# Patient Record
Sex: FEMALE | Race: BLACK OR AFRICAN AMERICAN
[De-identification: names, ages, dates, MRNs, and addresses within clinical notes are randomized per-mention and may not be internally consistent; named-entity substitution may affect disease eponyms.]

---

## 2018-01-02 ENCOUNTER — HOSPITAL ENCOUNTER (EMERGENCY)
Dept: HOSPITAL 74 - JER | Age: 38
Discharge: HOME | End: 2018-01-02
Payer: COMMERCIAL

## 2018-01-02 VITALS — HEART RATE: 82 BPM | SYSTOLIC BLOOD PRESSURE: 126 MMHG | DIASTOLIC BLOOD PRESSURE: 75 MMHG | TEMPERATURE: 98.6 F

## 2018-01-02 VITALS — BODY MASS INDEX: 30 KG/M2

## 2018-01-02 DIAGNOSIS — F17.210: ICD-10-CM

## 2018-01-02 DIAGNOSIS — M54.12: ICD-10-CM
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
## 2018-01-02 DIAGNOSIS — S16.1XXA: Primary | ICD-10-CM
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
## 2018-01-02 PROCEDURE — 3E0233Z INTRODUCTION OF ANTI-INFLAMMATORY INTO MUSCLE, PERCUTANEOUS APPROACH: ICD-10-PCS

## 2018-01-02 NOTE — PDOC
History of Present Illness





- General


Chief Complaint: Headache


Stated Complaint: CHEST PAIN


Time Seen by Provider: 01/02/18 12:01


History Source: Patient


Exam Limitations: No Limitations





- History of Present Illness


Initial Comments: 








CHIEF COMPLAINT:  38 y/o afebrile female with PMH chronic pain (on tramadol and 

oxy at home daily) c/o left sided head pain that started today. 





HISTORY OF PRESENT ILLNESS:  The patient states the pain actually starts in the 

left side of her neck and shoots up to her head and down to her left arm and 

chest.  She states the pain is worse with certain neck movements and when she 

touches the area.  She denies f/c, n/v/d, cough, SOB, changes in vision/hearing

, abd pain, back pain, hematuria, dysuria. 





Vital signs on arrival are within normal limits. 





REVIEW OF SYSTEMS:


GENERAL/CONSTITUTIONAL: No fever/chills. No weakness. No weight change.


HEAD, EYES, EARS, NOSE AND THROAT: No change in vision. No ear pain or 

discharge. No sore throat.


CARDIOVASCULAR: +chest pain.  No shortness of breath.


RESPIRATORY: No cough, wheezing, or hemoptysis.


GASTROINTESTINAL: No abd pain, nausea, vomiting, diarrhea. 


GENITOURINARY: No dysuria, frequency, or change in urination.


MUSCULOSKELETAL: +left sided neck pain.  +left arm pain


SKIN: No rash or easy bruising.


NEUROLOGIC: +left head pain.  No headache, vertigo, loss of consciousness, or 

loss of sensation.








PHYSICAL EXAM:


GENERAL: The patient is awake, alert, and fully oriented, in no acute distress.


HEAD: reproducible pain with palpation of left posterior SCM muscle and 

posterior scalp.  Normal with no signs of trauma.  No hematomas. 


NECK: Reproducible pain with palpation of left SCM muscle and left cervical 

paravertebral muscles with radiation to left arm and anterior chest.  Pain with 

lateral movement of neck towards right shoulder. 


ENT: Pupils equal, round and reactive to light, extraocular movements intact, 

sclera anicteric, conjunctiva clear. 


LUNGS: Clear to auscultation bilaterally. Normal excursion. No respiratory 

distress or use of accessory muscles.


CV: RRR, S1/S2, no MRG. Cap refill < 2 sec.


CHEST:  Reproducible anterior chest pain with palpation. 


ABDOMEN: Soft, non-distended, non-tender even to deep palpation, no 

hepatomegaly or splenomegaly, no masses.


EXTREMITIES: Normal range of motion, no edema.


NEUROLOGICAL: Normal speech, normal gait. CN II-XII grossly intact.


PSYCH: Normal mood, normal affect.


SKIN: Warm, dry, normal turgor, no rashes or lesions noted.











Past History





- Past Medical History


Allergies/Adverse Reactions: 


 Allergies











Allergy/AdvReac Type Severity Reaction Status Date / Time


 


No Known Allergies Allergy   Verified 01/02/18 10:24











Home Medications: 


Ambulatory Orders





Oxycodone HCl/Acetaminophen [Oxycodon-Acetaminophen 7.5-325] 1 each PO QID PRN 

01/02/18 


Tramadol HCl 50 mg PO DAILY PRN MDD back pain 01/02/18 








COPD: No


Other medical history: back injury/back pain





- Suicide/Smoking/Psychosocial Hx


Smoking History: Current every day smoker


Have you smoked in the past 12 months: Yes


Number of Cigarettes Smoked Daily: 6


Information on smoking cessation initiated: Yes


'Breaking Loose' booklet given: 01/02/18


Hx Alcohol Use: No


Drug/Substance Use Hx: No


Substance Use Type: None





*Physical Exam





- Vital Signs


 Last Vital Signs











Temp Pulse Resp BP Pulse Ox


 


 98.6 F   82   18   126/75   100 


 


 01/02/18 10:25  01/02/18 10:25  01/02/18 10:25  01/02/18 10:25  01/02/18 10:25














**Heart Score/ECG Review





- ECG Intrepretation


Comment:: 








Twelve-lead EKG was performed and reviewed by Dr. Payton. There is normal 

sinus rhythm with a normal rate. The axis is normal. The intervals are normal. 

There are no ST or T wave abnormalities.





Impression: Normal twelve-lead EKG





Medical Decision Making





- Medical Decision Making





A/P:  38 y/o afebrile female with left neck strain and radiculopathy.  Plan is 

as follows:





1. EKG


2. hcg


3. IM Toradol





EKG normal 





hcg - negative


Toradol given





Patient admits she takes Tramadol 5x/day and oxycodone.  She states the 

tramadol does help with the neck and arm pain.  I suggested she continue taking 

the tramadol for the musculoskeletal pain, stretch her neck muscles and 

alternate with heat/ice to affected area.  Instructed her to f/u with a PCP and 

referred her to Dr. Lopez.  Instructed her to return to the ER with any 

worsening or concerning symptoms. 





The patient verbalizes understanding of all instructions, has no further 

questions and is awaiting discharge.





*DC/Admit/Observation/Transfer


Diagnosis at time of Disposition: 


 Radiculopathy of arm, Musculoskeletal chest pain





Neck muscle strain


Qualifiers:


 Encounter type: initial encounter Qualified Code(s): S16.1XXA - Strain of 

muscle, fascia and tendon at neck level, initial encounter








- Discharge Dispostion


Disposition: HOME


Condition at time of disposition: Improved





- Referrals


Referrals: 


Joe Lopez MD [Staff Physician] - 





- Patient Instructions


Printed Discharge Instructions:  DI for Cervical Radiculopathy, DI for 

Musculoskeletal Pain, How To Perform RICE (Rest, Ice, Compress, Elevate)


Additional Instructions: 


Discharge Instructions:


-Continue taking your pain medication at home as prescribed


-Alternate between a heating pad and an ice pack to the affected area of your 

neck. 


-stretch your neck muscles multiple times per day


-Follow up with Dr. Lopez as soon as possible


-Return to the ER with any worsening or concerning symptoms. 





- Post Discharge Activity

## 2018-01-04 NOTE — EKG
Test Reason : 

Blood Pressure : ***/*** mmHG

Vent. Rate : 084 BPM     Atrial Rate : 084 BPM

   P-R Int : 148 ms          QRS Dur : 086 ms

    QT Int : 394 ms       P-R-T Axes : 053 048 041 degrees

   QTc Int : 465 ms

 

NORMAL SINUS RHYTHM

NORMAL ECG

NO PREVIOUS ECGS AVAILABLE

Confirmed by MEGAN POZO MD ([id number]) on 1/4/2018 12:30:31 PM

 

Referred By:             Confirmed By:MEGAN POZO MD

## 2018-05-04 ENCOUNTER — HOSPITAL ENCOUNTER (EMERGENCY)
Dept: HOSPITAL 74 - JERFT | Age: 38
Discharge: LEFT BEFORE DISCHARGE | End: 2018-05-04
Payer: COMMERCIAL

## 2018-05-04 VITALS — TEMPERATURE: 98.7 F | HEART RATE: 89 BPM | SYSTOLIC BLOOD PRESSURE: 136 MMHG | DIASTOLIC BLOOD PRESSURE: 66 MMHG

## 2018-05-04 VITALS — BODY MASS INDEX: 25.7 KG/M2

## 2018-05-04 DIAGNOSIS — R20.0: Primary | ICD-10-CM

## 2018-05-04 DIAGNOSIS — F17.210: ICD-10-CM

## 2018-05-04 DIAGNOSIS — H53.2: ICD-10-CM

## 2018-05-04 NOTE — PDOC
History of Present Illness





- General


Chief Complaint: Head/Neck problem


Stated Complaint: PAIN/ HEAD, FACE


Time Seen by Provider: 05/04/18 11:57


History Source: Patient


Exam Limitations: No Limitations





- History of Present Illness


Initial Comments: 





05/04/18 14:36


Patient is a 38-year-old female no past medical history presents emergency 

department today complaining of numbness to the left side of her face. Patient 

states that she's had this problem for approximately one year intermittently. 

She was initially evaluated by year ago at [facility] and had a negative CT 

at that time. She followed up with both her primary care doctor and a 

neurologist were unable to find the source of her tingling. She states that 

over the past week the tingling is back in sensation in stronger than last 

time. Patient also reports seeing double. Denies speech changes, facial droop, 

one-sided weakness, fevers, chills, nausea, vomiting and diarrhea.





Past History





- Travel


Traveled outside of the country in the last 30 days: No


Close contact w/someone who was outside of country & ill: No





- Past Medical History


Allergies/Adverse Reactions: 


 Allergies











Allergy/AdvReac Type Severity Reaction Status Date / Time


 


No Known Allergies Allergy   Verified 05/04/18 11:23











Home Medications: 


Ambulatory Orders





NK [No Known Home Medication]  05/04/18 








COPD: No





- Suicide/Smoking/Psychosocial Hx


Smoking History: Current every day smoker


Have you smoked in the past 12 months: Yes


Number of Cigarettes Smoked Daily: 6


Information on smoking cessation initiated: No


'Breaking Loose' booklet given: 01/02/18


Hx Alcohol Use: No


Drug/Substance Use Hx: No


Substance Use Type: None





**Review of Systems





- Review of Systems


Able to Perform ROS?: Yes


Comments:: 





05/04/18 14:33


CONSTITUTIONAL: 


Absent: fever, chills, diaphoresis, generalized weakness, malaise, loss of 

appetite


HEENT: 


Present: diploplia Absent: rhinorrhea, nasal congestion, throat pain, throat 

swelling, difficulty swallowing, mouth swelling, ear pain, eye pain, visual 

Changes


CARDIOVASCULAR: 


Absent: chest pain, loss of consciousness, palpitations, irregular heart rate, 

peripheral edema


RESPIRATORY: 


Absent: cough, shortness of breath, dyspnea with exertion, orthopnea, wheezing, 

stridor, hemoptysis


GASTROINTESTINAL:


Absent: abdominal pain, abdominal distension, nausea, vomiting, diarrhea, 

constipation, melena, hematochezia


GENITOURINARY: 


Absent: dysuria, frequency, urgency, hesitancy, hematuria, flank pain, genital 

pain


MUSCULOSKELETAL: 


Absent: myalgia, arthralgia, joint swelling


SKIN: 


Absent: rash, itching, pallor


NEUROLOGIC: 


Present: L facial numbness Absent: headache, focal weakness or paresthesias, 

dizziness, unsteady gait, seizure, mental status changes, bladder or bowel 

incontinence


PSYCHIATRIC: 


Absent: anxiety, depression, suicidal or homicidal ideation, hallucinations.


05/04/18 19:36





Is the patient limited English proficient: No





*Physical Exam





- Vital Signs


 Last Vital Signs











Temp Pulse Resp BP Pulse Ox


 


 98.7 F   89   18   136/66   100 


 


 05/04/18 11:23  05/04/18 11:23  05/04/18 11:23  05/04/18 11:23  05/04/18 11:23














- Physical Exam


Comments: 





05/04/18 19:34


GENERAL:


Well developed, well nourished. Awake and alert. No acute distress.


HEENT:


Normocephalic, atraumatic. PERRLA, EOMI with diploplia. No conjunctival pallor. 

Sclera are non-icteric. Moist mucous membranes. Oropharynx is clear.


NECK: 


Supple. Full ROM. No JVD. Carotid pulses 2+ and symmetric, without bruits. No 

thyromegaly. No lymphadenopathy.


MUSCULOSKELETAL 


Normal range of motion at all joints. No bony deformities or tenderness. No CVA 

tenderness.


EXTREMITIES: 


No cyanosis. No clubbing. No edema. No calf tenderness.


SKIN: 


Warm and dry. Normal capillary refill. No rashes. No jaundice. 


NEUROLOGICAL: 


Alert, awake, appropriate. Cranial nerves 2-12 intact. No deficits to light 

touch and temperature in face, upper extremities and lower extremities. No 

motor deficits in the in face, upper extremities and lower extremities. 

Normoreflexic in the upper and lower extremities. Normal speech. Toes are down-

going bilaterally. Gait is normal without ataxia.


PSYCHIATRIC: 


Cooperative. Good eye contact. Appropriate mood and affect.


0








ED Treatment Course





- RADIOLOGY


Radiology Studies Ordered: 














 Category Date Time Status


 


 HEAD CT WITHOUT CONTRAST [CT] Stat CT Scan  05/04/18 12:28 Ordered














Medical Decision Making





- Medical Decision Making





05/04/18 14:31


Pt. is a 37 y/o F who presented to the ED c/o facial numbness intermittantly 

for one year with exacerbation over the past week. Neuro exam is grossly 

normal. Pt reports that sensation feels the same on both sides on exam. VSS, pt 

afebrile.  CT head was ordered for the patient given numbness and diploplia. 

Prior to pt receiving the scan she gets into a verbal altercation with a person 

on the phone. She is screaming into the phone and steps out of the exam room 

saying "Discharge me, I'm out." and proceeds to walk out the Emergency 

Department. Pt elopes from the ED.








*DC/Admit/Observation/Transfer


Diagnosis at time of Disposition: 


 Eloped








- Discharge Dispostion


Disposition: ELOPED





- Referrals





- Patient Instructions





- Post Discharge Activity

## 2018-12-04 ENCOUNTER — HOSPITAL ENCOUNTER (EMERGENCY)
Dept: HOSPITAL 74 - JER | Age: 38
LOS: 1 days | Discharge: HOME | End: 2018-12-05
Payer: COMMERCIAL

## 2018-12-04 VITALS — SYSTOLIC BLOOD PRESSURE: 133 MMHG | TEMPERATURE: 98.9 F | HEART RATE: 78 BPM | DIASTOLIC BLOOD PRESSURE: 82 MMHG

## 2018-12-04 VITALS — BODY MASS INDEX: 26.7 KG/M2

## 2018-12-04 DIAGNOSIS — R51: Primary | ICD-10-CM

## 2018-12-04 DIAGNOSIS — F17.210: ICD-10-CM

## 2018-12-04 PROCEDURE — 3E0233Z INTRODUCTION OF ANTI-INFLAMMATORY INTO MUSCLE, PERCUTANEOUS APPROACH: ICD-10-PCS

## 2018-12-05 NOTE — PDOC
*Physical Exam





- Vital Signs


 Last Vital Signs











Temp Pulse Resp BP Pulse Ox


 


 98.9 F   78   17   133/82   100 


 


 12/04/18 22:54  12/04/18 22:54  12/04/18 22:54  12/04/18 22:54  12/04/18 22:54














ED Treatment Course





- ADDITIONAL ORDERS


Additional order review: 


 Laboratory  Results











  12/05/18





  00:07


 


Urine HCG, Qual  Negative














Medical Decision Making





- Medical Decision Making





12/05/18 00:34


Pt seen by Midlevel Provider under my direct supervision


Pt interviewed and examined


Ancillary studies reviewed





I agree with plan as outlined by Midlevel Provider





*DC/Admit/Observation/Transfer





- Referrals


Referrals: 


Genny Tovar NP [Primary Care Provider] - 





- Patient Instructions





- Post Discharge Activity

## 2018-12-05 NOTE — PDOC
History of Present Illness





- General


Chief Complaint: Pain


Stated Complaint: PAIN


Time Seen by Provider: 12/04/18 22:55


History Source: Patient





Past History





- Past Medical History


Allergies/Adverse Reactions: 


 Allergies











Allergy/AdvReac Type Severity Reaction Status Date / Time


 


latex Allergy Mild Itching Verified 12/04/18 22:57











Home Medications: 


Ambulatory Orders





NK [No Known Home Medication]  05/04/18 








COPD: No





- Suicide/Smoking/Psychosocial Hx


Smoking History: Current every day smoker


Have you smoked in the past 12 months: Yes


Number of Cigarettes Smoked Daily: 6


Information on smoking cessation initiated: No


'Breaking Loose' booklet given: 01/02/18


Hx Alcohol Use: No


Drug/Substance Use Hx: No


Substance Use Type: None





*Physical Exam





- Vital Signs


 Last Vital Signs











Temp Pulse Resp BP Pulse Ox


 


 98.9 F   78   17   133/82   100 


 


 12/04/18 22:54  12/04/18 22:54  12/04/18 22:54  12/04/18 22:54  12/04/18 22:54














- Physical Exam


General Appearance: No: Apparent Distress


HEENT: positive: EOMI, JULIA, Normal Voice


Neck: positive: Supple.  negative: Tender, Rigid, Decreased range of motion, 

Rigidity, Tender lateral, Tender midline


Respiratory/Chest: positive: Lungs Clear, Normal Breath Sounds.  negative: 

Respiratory Distress


Cardiovascular: positive: Regular Rhythm, Regular Rate, S1, S2.  negative: 

Murmur


Gastrointestinal/Abdominal: positive: Normal Bowel Sounds, Soft.  negative: 

Tender, Distended, Guarding, Rebound


Neurologic: positive: CNs II-XII NML intact, Fully Oriented, Alert, Normal Mood/

Affect, Normal Response, Motor Strength 5/5.  negative: Facial Droop, Sensory 

Deficit, Confused, Disoriented





Moderate Sedation





- Procedure Monitoring


Vital Signs: 


Procedure Monitoring Vital Signs











Temperature  98.9 F   12/04/18 22:54


 


Pulse Rate  78   12/04/18 22:54


 


Respiratory Rate  17   12/04/18 22:54


 


Blood Pressure  133/82   12/04/18 22:54


 


O2 Sat by Pulse Oximetry (%)  100   12/04/18 22:54











ED Treatment Course





- ADDITIONAL ORDERS


Additional order review: 


 Laboratory  Results











  12/05/18





  00:07


 


Urine HCG, Qual  Negative














- Medications


Given in the ED: 


ED Medications














Discontinued Medications














Generic Name Dose Route Start Last Admin





  Trade Name Freq  PRN Reason Stop Dose Admin


 


Ketorolac Tromethamine  30 mg  12/05/18 00:48  12/05/18 00:55





  Toradol Injection -  IM  12/05/18 00:49  30 mg





  ONCE ONE   Administration





     





     





     





     


 


Methocarbamol  1,000 mg  12/05/18 00:48  12/05/18 00:55





  Robaxin -  PO  12/05/18 00:49  1,000 mg





  ONCE ONE   Administration





     





     





     





     














Medical Decision Making





- Medical Decision Making


39 y/o F hx of chronic HA presents with L sided HA and L sided neck stiffness 

going on for around 1 year, worsening today. Patient was seen in ED on 1/2018 

and 5/2018 for similar complaints. States she has also been to [facility] and 

has had CT imaging and MRI done of her brain (a few months back; unsure of 

exact date), but nothing was found. States she was referred to a neurologist, 

but also nothing was done. Mentions Percocet and Valium help with the pain. 

States she also saw her PCP who told her it was probably related to sinuses but 

that was also few months back. Denies fever, sob, cp, abd pain, n/v, photophobia

, visual/gait changes, weakness of extremities





PE unremarkable with no focal deficits.


Given Toradol and Robaxin, but patient unhappy. She shows empty bottle of 

Valium and request for rx for Valium.


Concern for drug seeking behavior


Requesting referral to neurology


Will refer





Stable for d/c





12/05/18 01:19











*DC/Admit/Observation/Transfer


Diagnosis at time of Disposition: 


Headache


Qualifiers:


 Headache type: unspecified Headache chronicity pattern: chronic headache 

Intractability: not intractable Qualified Code(s): R51 - Headache








- Discharge Dispostion


Disposition: HOME


Condition at time of disposition: Stable





- Referrals


Referrals: 


Genny Tovar NP [Primary Care Provider] - 3 days


Tenzin Brooke MD [Staff Physician] - Call tomorrow





- Patient Instructions


Printed Discharge Instructions:  DI for Headache


Additional Instructions: 





Thank you for choosing [facility].  It was a pleasure taking 

care of you.  





You were seen here for headache


You were referred to a neurologist to get further evaluation on the cause of 

your headaches.





Return to the Emergency Department if your symptoms worsen or persist, you have 

fever, vomiting, weakness of extremities (arms and/or legs), changes in vision 

or walking or other concerning symptoms. 





- Post Discharge Activity

## 2021-08-31 ENCOUNTER — HOSPITAL ENCOUNTER (EMERGENCY)
Dept: HOSPITAL 74 - JER | Age: 41
Discharge: ANOTHER HOSPITAL | End: 2021-08-31
Payer: COMMERCIAL

## 2021-08-31 VITALS — DIASTOLIC BLOOD PRESSURE: 77 MMHG | TEMPERATURE: 98 F | SYSTOLIC BLOOD PRESSURE: 121 MMHG

## 2021-08-31 VITALS — HEART RATE: 77 BPM

## 2021-08-31 VITALS — BODY MASS INDEX: 34 KG/M2

## 2021-08-31 DIAGNOSIS — R10.9: ICD-10-CM

## 2021-08-31 DIAGNOSIS — Z3A.19: ICD-10-CM

## 2021-08-31 DIAGNOSIS — O26.892: Primary | ICD-10-CM

## 2021-08-31 LAB
ALBUMIN SERPL-MCNC: 3.2 G/DL (ref 3.4–5)
ALP SERPL-CCNC: 85 U/L (ref 45–117)
ALT SERPL-CCNC: 40 U/L (ref 13–61)
ANION GAP SERPL CALC-SCNC: 9 MMOL/L (ref 8–16)
APPEARANCE UR: (no result)
AST SERPL-CCNC: 69 U/L (ref 15–37)
BASOPHILS # BLD: 0.2 % (ref 0–2)
BILIRUB SERPL-MCNC: 0.2 MG/DL (ref 0.2–1)
BILIRUB UR STRIP.AUTO-MCNC: NEGATIVE MG/DL
BUN SERPL-MCNC: 6.4 MG/DL (ref 7–18)
CALCIUM SERPL-MCNC: 9.1 MG/DL (ref 8.5–10.1)
CHLORIDE SERPL-SCNC: 107 MMOL/L (ref 98–107)
CO2 SERPL-SCNC: 19 MMOL/L (ref 21–32)
COLOR UR: YELLOW
CREAT SERPL-MCNC: 0.5 MG/DL (ref 0.55–1.3)
DEPRECATED RDW RBC AUTO: 14.4 % (ref 11.6–15.6)
EOSINOPHIL # BLD: 0.3 % (ref 0–4.5)
GLUCOSE SERPL-MCNC: 105 MG/DL (ref 74–106)
HCT VFR BLD CALC: 29.9 % (ref 32.4–45.2)
HGB BLD-MCNC: 10.2 GM/DL (ref 10.7–15.3)
KETONES UR QL STRIP: NEGATIVE
LEUKOCYTE ESTERASE UR QL STRIP.AUTO: NEGATIVE
LIPASE SERPL-CCNC: 520 U/L (ref 73–393)
LYMPHOCYTES # BLD: 10.1 % (ref 8–40)
MCH RBC QN AUTO: 31.7 PG (ref 25.7–33.7)
MCHC RBC AUTO-ENTMCNC: 34.1 G/DL (ref 32–36)
MCV RBC: 93.1 FL (ref 80–96)
MONOCYTES # BLD AUTO: 6.4 % (ref 3.8–10.2)
NEUTROPHILS # BLD: 83 % (ref 42.8–82.8)
NITRITE UR QL STRIP: NEGATIVE
PH UR: 7.5 [PH] (ref 5–8)
PLATELET # BLD AUTO: 220 10^3/UL (ref 134–434)
PMV BLD: 10.5 FL (ref 7.5–11.1)
PROT SERPL-MCNC: 7.4 G/DL (ref 6.4–8.2)
PROT UR QL STRIP: (no result)
PROT UR QL STRIP: NEGATIVE
RBC # BLD AUTO: 3.21 M/MM3 (ref 3.6–5.2)
SODIUM SERPL-SCNC: 135 MMOL/L (ref 136–145)
SP GR UR: 1.02 (ref 1.01–1.03)
UROBILINOGEN UR STRIP-MCNC: 0.2 MG/DL (ref 0.2–1)
WBC # BLD AUTO: 15 K/MM3 (ref 4–10)

## 2021-08-31 PROCEDURE — 3E033GC INTRODUCTION OF OTHER THERAPEUTIC SUBSTANCE INTO PERIPHERAL VEIN, PERCUTANEOUS APPROACH: ICD-10-PCS

## 2021-08-31 PROCEDURE — U0005 INFEC AGEN DETEC AMPLI PROBE: HCPCS

## 2021-08-31 PROCEDURE — 3E033NZ INTRODUCTION OF ANALGESICS, HYPNOTICS, SEDATIVES INTO PERIPHERAL VEIN, PERCUTANEOUS APPROACH: ICD-10-PCS

## 2021-08-31 PROCEDURE — U0003 INFECTIOUS AGENT DETECTION BY NUCLEIC ACID (DNA OR RNA); SEVERE ACUTE RESPIRATORY SYNDROME CORONAVIRUS 2 (SARS-COV-2) (CORONAVIRUS DISEASE [COVID-19]), AMPLIFIED PROBE TECHNIQUE, MAKING USE OF HIGH THROUGHPUT TECHNOLOGIES AS DESCRIBED BY CMS-2020-01-R: HCPCS

## 2021-08-31 PROCEDURE — C9803 HOPD COVID-19 SPEC COLLECT: HCPCS

## 2021-08-31 PROCEDURE — 3E0333Z INTRODUCTION OF ANTI-INFLAMMATORY INTO PERIPHERAL VEIN, PERCUTANEOUS APPROACH: ICD-10-PCS

## 2022-01-24 ENCOUNTER — HOSPITAL ENCOUNTER (INPATIENT)
Dept: HOSPITAL 74 - JLDR | Age: 42
LOS: 3 days | Discharge: HOME | End: 2022-01-27
Payer: COMMERCIAL

## 2022-01-24 VITALS — BODY MASS INDEX: 35.2 KG/M2

## 2022-01-24 DIAGNOSIS — O34.211: Primary | ICD-10-CM

## 2022-01-24 DIAGNOSIS — D25.9: ICD-10-CM

## 2022-01-24 DIAGNOSIS — Z3A.39: ICD-10-CM

## 2022-01-24 DIAGNOSIS — O34.13: ICD-10-CM

## 2022-01-24 LAB
BASE EXCESS BLDCOA CALC-SCNC: -5 MMOL/L (ref 0–2)
BASE EXCESS BLDCOA CALC-SCNC: -5.3 MMOL/L (ref 0–2)
HCO3 BLDCO-SCNC: 20.8 MMHG (ref 20–29)
HCO3 BLDCO-SCNC: 23.2 MMHG (ref 20–29)
PCO2 BLDCO: 42.4 MMHG (ref 30–78)
PCO2 BLDCO: 54.6 MMHG (ref 30–78)
PH BLDCO: 7.25 [PH] (ref 7.14–7.44)
PH BLDCO: 7.31 [PH] (ref 7.14–7.44)

## 2022-01-24 RX ADMIN — IBUPROFEN PRN MG: 800 INJECTION INTRAVENOUS at 23:54

## 2022-01-25 LAB
BASOPHILS # BLD: 0.3 % (ref 0–2)
DEPRECATED RDW RBC AUTO: 15.8 % (ref 11.6–15.6)
EOSINOPHIL # BLD: 0.4 % (ref 0–4.5)
HCT VFR BLD CALC: 28 % (ref 32.4–45.2)
HGB BLD-MCNC: 9.3 GM/DL (ref 10.7–15.3)
LYMPHOCYTES # BLD: 11.1 % (ref 8–40)
MCH RBC QN AUTO: 30.4 PG (ref 25.7–33.7)
MCHC RBC AUTO-ENTMCNC: 33.4 G/DL (ref 32–36)
MCV RBC: 91.1 FL (ref 80–96)
MONOCYTES # BLD AUTO: 8.2 % (ref 3.8–10.2)
NEUTROPHILS # BLD: 80 % (ref 42.8–82.8)
PLATELET # BLD AUTO: 172 10^3/UL (ref 134–434)
PMV BLD: 9.6 FL (ref 7.5–11.1)
RBC # BLD AUTO: 3.07 M/MM3 (ref 3.6–5.2)
WBC # BLD AUTO: 10 K/MM3 (ref 4–10)

## 2022-01-25 RX ADMIN — SIMETHICONE CHEW TAB 80 MG PRN MG: 80 TABLET ORAL at 21:45

## 2022-01-25 RX ADMIN — IBUPROFEN PRN MG: 800 INJECTION INTRAVENOUS at 07:21

## 2022-01-25 RX ADMIN — Medication SCH TAB: at 09:37

## 2022-01-25 RX ADMIN — IBUPROFEN PRN MG: 600 TABLET, FILM COATED ORAL at 21:45

## 2022-01-25 RX ADMIN — SIMETHICONE CHEW TAB 80 MG PRN MG: 80 TABLET ORAL at 09:37

## 2022-01-25 RX ADMIN — FERROUS SULFATE TAB EC 324 MG (65 MG FE EQUIVALENT) SCH MG: 324 (65 FE) TABLET DELAYED RESPONSE at 09:37

## 2022-01-25 RX ADMIN — IBUPROFEN PRN MG: 600 TABLET, FILM COATED ORAL at 14:24

## 2022-01-25 RX ADMIN — SIMETHICONE CHEW TAB 80 MG PRN MG: 80 TABLET ORAL at 14:24

## 2022-01-26 RX ADMIN — Medication SCH TAB: at 10:38

## 2022-01-26 RX ADMIN — IBUPROFEN PRN MG: 600 TABLET, FILM COATED ORAL at 03:52

## 2022-01-26 RX ADMIN — IBUPROFEN PRN MG: 600 TABLET, FILM COATED ORAL at 16:09

## 2022-01-26 RX ADMIN — FERROUS SULFATE TAB EC 324 MG (65 MG FE EQUIVALENT) SCH MG: 324 (65 FE) TABLET DELAYED RESPONSE at 10:38

## 2022-01-26 RX ADMIN — SIMETHICONE CHEW TAB 80 MG PRN MG: 80 TABLET ORAL at 16:09

## 2022-01-26 RX ADMIN — SIMETHICONE CHEW TAB 80 MG PRN MG: 80 TABLET ORAL at 03:52

## 2022-01-27 VITALS — TEMPERATURE: 98.4 F | DIASTOLIC BLOOD PRESSURE: 86 MMHG | HEART RATE: 87 BPM | SYSTOLIC BLOOD PRESSURE: 130 MMHG

## 2022-01-27 RX ADMIN — Medication SCH TAB: at 09:14

## 2022-01-27 RX ADMIN — FERROUS SULFATE TAB EC 324 MG (65 MG FE EQUIVALENT) SCH MG: 324 (65 FE) TABLET DELAYED RESPONSE at 09:14
